# Patient Record
Sex: MALE | Race: WHITE | NOT HISPANIC OR LATINO
[De-identification: names, ages, dates, MRNs, and addresses within clinical notes are randomized per-mention and may not be internally consistent; named-entity substitution may affect disease eponyms.]

---

## 2018-11-20 ENCOUNTER — TRANSCRIPTION ENCOUNTER (OUTPATIENT)
Age: 63
End: 2018-11-20

## 2019-05-15 ENCOUNTER — TRANSCRIPTION ENCOUNTER (OUTPATIENT)
Age: 64
End: 2019-05-15

## 2021-07-26 ENCOUNTER — NON-APPOINTMENT (OUTPATIENT)
Age: 66
End: 2021-07-26

## 2021-08-06 PROBLEM — Z00.00 ENCOUNTER FOR PREVENTIVE HEALTH EXAMINATION: Status: ACTIVE | Noted: 2021-08-06

## 2021-08-10 ENCOUNTER — NON-APPOINTMENT (OUTPATIENT)
Age: 66
End: 2021-08-10

## 2021-08-11 ENCOUNTER — APPOINTMENT (OUTPATIENT)
Dept: GASTROENTEROLOGY | Facility: CLINIC | Age: 66
End: 2021-08-11
Payer: MEDICARE

## 2021-08-11 VITALS
OXYGEN SATURATION: 97 % | SYSTOLIC BLOOD PRESSURE: 110 MMHG | BODY MASS INDEX: 31.1 KG/M2 | HEART RATE: 62 BPM | TEMPERATURE: 96.5 F | DIASTOLIC BLOOD PRESSURE: 80 MMHG | WEIGHT: 210 LBS | HEIGHT: 69 IN

## 2021-08-11 DIAGNOSIS — Z78.9 OTHER SPECIFIED HEALTH STATUS: ICD-10-CM

## 2021-08-11 DIAGNOSIS — Z12.11 ENCOUNTER FOR SCREENING FOR MALIGNANT NEOPLASM OF COLON: ICD-10-CM

## 2021-08-11 DIAGNOSIS — R10.31 RIGHT LOWER QUADRANT PAIN: ICD-10-CM

## 2021-08-11 PROCEDURE — 99204 OFFICE O/P NEW MOD 45 MIN: CPT

## 2021-08-11 NOTE — PHYSICAL EXAM
[General Appearance - Alert] : alert [General Appearance - In No Acute Distress] : in no acute distress [Sclera] : the sclera and conjunctiva were normal [Outer Ear] : the ears and nose were normal in appearance [Neck Appearance] : the appearance of the neck was normal [] : no respiratory distress [Abdomen Soft] : soft [Abnormal Walk] : normal gait [No Focal Deficits] : no focal deficits [Skin Color & Pigmentation] : normal skin color and pigmentation [Oriented To Time, Place, And Person] : oriented to person, place, and time

## 2021-08-11 NOTE — ASSESSMENT
[FreeTextEntry1] : 1.  RLQ  pain:  improved but  persistent. CT scan ordered\par \par 2. Colon cancer screening:  Colonoscopy scheduled\par \par Risks of the procedure including but not limited to bleeding / perforation / infection / anesthesia complication / missed polyp or lesion explained to the  patient . The patient expressed understanding and a desire to proceed with the procedure.\par \par Risk of not doing procedure includes but is not limited to missed or delayed diagnosis of gastrointestinal pathology.\par

## 2021-08-11 NOTE — HISTORY OF PRESENT ILLNESS
[de-identified] : Present s  with a  5 month c/o RLQ pain. Pain was intense 5  months  ago. pain has gradually subsided and  now  occurs occasionally.  Denies  nausea, vomiting, fever, chills, diarrhea, constipation, melena, hematemesis, BRBPR, unexpected weight loss. No clear  precipitating / alleviating factors  identified.\par EGD / colonoscopy in 2/2012 for GERD / colon cancer screening  was notable  for HH  / esophagitis , diverticulosis ,hemorrhoids

## 2021-08-17 ENCOUNTER — RESULT REVIEW (OUTPATIENT)
Age: 66
End: 2021-08-17

## 2021-09-18 ENCOUNTER — RESULT REVIEW (OUTPATIENT)
Age: 66
End: 2021-09-18

## 2021-09-20 ENCOUNTER — RESULT REVIEW (OUTPATIENT)
Age: 66
End: 2021-09-20

## 2021-09-21 ENCOUNTER — APPOINTMENT (OUTPATIENT)
Dept: GASTROENTEROLOGY | Facility: HOSPITAL | Age: 66
End: 2021-09-21

## 2022-01-20 ENCOUNTER — NON-APPOINTMENT (OUTPATIENT)
Age: 67
End: 2022-01-20

## 2022-02-03 ENCOUNTER — LABORATORY RESULT (OUTPATIENT)
Age: 67
End: 2022-02-03

## 2022-02-03 ENCOUNTER — NON-APPOINTMENT (OUTPATIENT)
Age: 67
End: 2022-02-03

## 2022-02-03 ENCOUNTER — APPOINTMENT (OUTPATIENT)
Dept: HEART AND VASCULAR | Facility: CLINIC | Age: 67
End: 2022-02-03
Payer: MEDICARE

## 2022-02-03 VITALS
HEIGHT: 69.25 IN | WEIGHT: 214 LBS | DIASTOLIC BLOOD PRESSURE: 70 MMHG | SYSTOLIC BLOOD PRESSURE: 116 MMHG | BODY MASS INDEX: 31.34 KG/M2

## 2022-02-03 VITALS — OXYGEN SATURATION: 97 % | RESPIRATION RATE: 16 BRPM | HEART RATE: 66 BPM | TEMPERATURE: 97.8 F

## 2022-02-03 DIAGNOSIS — R53.83 OTHER FATIGUE: ICD-10-CM

## 2022-02-03 DIAGNOSIS — Z72.3 LACK OF PHYSICAL EXERCISE: ICD-10-CM

## 2022-02-03 DIAGNOSIS — Z78.9 OTHER SPECIFIED HEALTH STATUS: ICD-10-CM

## 2022-02-03 DIAGNOSIS — Z86.010 PERSONAL HISTORY OF COLONIC POLYPS: ICD-10-CM

## 2022-02-03 DIAGNOSIS — Z87.19 PERSONAL HISTORY OF OTHER DISEASES OF THE DIGESTIVE SYSTEM: ICD-10-CM

## 2022-02-03 DIAGNOSIS — R03.0 ELEVATED BLOOD-PRESSURE READING, W/OUT DIAGNOSIS OF HYPERTENSION: ICD-10-CM

## 2022-02-03 DIAGNOSIS — Z82.49 FAMILY HISTORY OF ISCHEMIC HEART DISEASE AND OTHER DISEASES OF THE CIRCULATORY SYSTEM: ICD-10-CM

## 2022-02-03 DIAGNOSIS — Z83.3 FAMILY HISTORY OF DIABETES MELLITUS: ICD-10-CM

## 2022-02-03 DIAGNOSIS — R00.2 PALPITATIONS: ICD-10-CM

## 2022-02-03 DIAGNOSIS — R07.89 OTHER CHEST PAIN: ICD-10-CM

## 2022-02-03 DIAGNOSIS — R06.00 DYSPNEA, UNSPECIFIED: ICD-10-CM

## 2022-02-03 DIAGNOSIS — E78.5 HYPERLIPIDEMIA, UNSPECIFIED: ICD-10-CM

## 2022-02-03 DIAGNOSIS — R55 SYNCOPE AND COLLAPSE: ICD-10-CM

## 2022-02-03 PROCEDURE — 93000 ELECTROCARDIOGRAM COMPLETE: CPT | Mod: 59

## 2022-02-03 PROCEDURE — 99205 OFFICE O/P NEW HI 60 MIN: CPT | Mod: 25

## 2022-02-03 PROCEDURE — 93248 EXT ECG>7D<15D REV&INTERPJ: CPT

## 2022-02-03 PROCEDURE — 93246 EXT ECG>7D<15D RECORDING: CPT

## 2022-02-03 NOTE — DISCUSSION/SUMMARY
[Paroxysmal Atrial Fibrillation] : paroxysmal atrial fibrillation [Possible Cardiac Ischemia (Intermd Prob)] : possible cardiac ischemia (intermediate probability) [Hyperlipidemia] : hyperlipidemia [Diet Modification] : diet modification [Exercise] : exercise [Hypertension] : hypertension [Exercise Regimen] : an exercise regimen [Dietary Modification] : dietary modification [Weight Loss] : weight loss [ETOH Moderation] : alcohol moderation [Rhythm Disorder] : rhythm disorder [Deteriorating] : deteriorating [Low Sodium Diet] : low sodium diet [Vasovagal Syncope] : vasovagal syncope [Stable] : stable [None] : There are no changes in medication management [Patient] : the patient [de-identified] : HOOD/Fatigue

## 2022-02-03 NOTE — REASON FOR VISIT
[Symptom and Test Evaluation] : symptom and test evaluation [Arrhythmia/ECG Abnorrmalities] : arrhythmia/ECG abnormalities [Hyperlipidemia] : hyperlipidemia [FreeTextEntry3] : Monroe Lucas

## 2022-02-03 NOTE — HISTORY OF PRESENT ILLNESS
[FreeTextEntry1] : Virgil Patel is a 65 yo male who presents for CV evaluation.  \par \par Approximately 4 years ago, he was evaluated at [facility] for AFIB.  He was not treated with rate control or TE prophylaxis.\par \par He has been experiencing palps (similar to his AFIB symptoms) intermittently, lasting for several hours at a time.  He has noted inc HOOD and fatigue.  He has exertional chest tightness as well.  He snores at night, wakes up feeling tired, and has daytime fatigue.  He denies pnd, orthopnea, or edema.  He did experience a "pre-syncopal" like episode at home a couple of days ago.  He recalls a "vasovagal" event years ago.  \par \par He is active.  He takes no medications.\par \par ECG today reveals NSR.\par \par Clinical hx reviewed in detail.\par \par Recommendations:\par 1. blood work\par 2. telemetry\par 3. t/c sleep eval \par 4. carotid duplex\par 5. CPET\par 6. EXSE\par 7. t/c CTA\par 8. t/c EP eval

## 2022-02-04 LAB
ALBUMIN SERPL ELPH-MCNC: 4.7 G/DL
ALP BLD-CCNC: 90 U/L
ALT SERPL-CCNC: 24 U/L
ANION GAP SERPL CALC-SCNC: 15 MMOL/L
AST SERPL-CCNC: 18 U/L
BASOPHILS # BLD AUTO: 0.05 K/UL
BASOPHILS NFR BLD AUTO: 0.8 %
BILIRUB SERPL-MCNC: 0.5 MG/DL
BUN SERPL-MCNC: 13 MG/DL
CALCIUM SERPL-MCNC: 9.6 MG/DL
CHLORIDE SERPL-SCNC: 103 MMOL/L
CHOLEST SERPL-MCNC: 245 MG/DL
CK SERPL-CCNC: 71 U/L
CO2 SERPL-SCNC: 24 MMOL/L
CREAT SERPL-MCNC: 1.08 MG/DL
EOSINOPHIL # BLD AUTO: 0.08 K/UL
EOSINOPHIL NFR BLD AUTO: 1.2 %
ESTIMATED AVERAGE GLUCOSE: 108 MG/DL
FERRITIN SERPL-MCNC: 67 NG/ML
GLUCOSE SERPL-MCNC: 97 MG/DL
HBA1C MFR BLD HPLC: 5.4 %
HCT VFR BLD CALC: 43.7 %
HDLC SERPL-MCNC: 72 MG/DL
HGB BLD-MCNC: 14.1 G/DL
IMM GRANULOCYTES NFR BLD AUTO: 0.2 %
IRON SATN MFR SERPL: 39 %
IRON SERPL-MCNC: 143 UG/DL
LDLC SERPL CALC-MCNC: 146 MG/DL
LYMPHOCYTES # BLD AUTO: 2.01 K/UL
LYMPHOCYTES NFR BLD AUTO: 31.1 %
MAN DIFF?: NORMAL
MCHC RBC-ENTMCNC: 30.9 PG
MCHC RBC-ENTMCNC: 32.3 GM/DL
MCV RBC AUTO: 95.6 FL
MONOCYTES # BLD AUTO: 0.47 K/UL
MONOCYTES NFR BLD AUTO: 7.3 %
NEUTROPHILS # BLD AUTO: 3.85 K/UL
NEUTROPHILS NFR BLD AUTO: 59.4 %
NONHDLC SERPL-MCNC: 173 MG/DL
NT-PROBNP SERPL-MCNC: 126 PG/ML
PLATELET # BLD AUTO: 260 K/UL
POTASSIUM SERPL-SCNC: 4.6 MMOL/L
PROT SERPL-MCNC: 7.1 G/DL
RBC # BLD: 4.57 M/UL
RBC # FLD: 13.5 %
SODIUM SERPL-SCNC: 142 MMOL/L
T3FREE SERPL-MCNC: 3.52 PG/ML
T3RU NFR SERPL: 1.1 TBI
T4 SERPL-MCNC: 8.7 UG/DL
TIBC SERPL-MCNC: 365 UG/DL
TRIGL SERPL-MCNC: 135 MG/DL
TROPONIN I SERPL-MCNC: 0.01 NG/ML
TROPONIN-T, HIGH SENSITIVITY: 10 NG/L
TSH SERPL-ACNC: 1.84 UIU/ML
UIBC SERPL-MCNC: 222 UG/DL
WBC # FLD AUTO: 6.47 K/UL

## 2022-03-04 ENCOUNTER — APPOINTMENT (OUTPATIENT)
Dept: HEART AND VASCULAR | Facility: CLINIC | Age: 67
End: 2022-03-04
Payer: MEDICARE

## 2022-03-04 ENCOUNTER — NON-APPOINTMENT (OUTPATIENT)
Age: 67
End: 2022-03-04

## 2022-03-04 VITALS
BODY MASS INDEX: 31.34 KG/M2 | DIASTOLIC BLOOD PRESSURE: 84 MMHG | HEIGHT: 69.25 IN | HEART RATE: 74 BPM | OXYGEN SATURATION: 97 % | WEIGHT: 214 LBS | SYSTOLIC BLOOD PRESSURE: 150 MMHG

## 2022-03-04 PROCEDURE — 93320 DOPPLER ECHO COMPLETE: CPT

## 2022-03-04 PROCEDURE — 93880 EXTRACRANIAL BILAT STUDY: CPT

## 2022-03-04 PROCEDURE — 93351 STRESS TTE COMPLETE: CPT

## 2022-03-04 PROCEDURE — 93325 DOPPLER ECHO COLOR FLOW MAPG: CPT

## 2022-03-08 ENCOUNTER — NON-APPOINTMENT (OUTPATIENT)
Age: 67
End: 2022-03-08

## 2022-03-10 ENCOUNTER — NON-APPOINTMENT (OUTPATIENT)
Age: 67
End: 2022-03-10

## 2022-03-11 ENCOUNTER — NON-APPOINTMENT (OUTPATIENT)
Age: 67
End: 2022-03-11

## 2022-03-11 ENCOUNTER — APPOINTMENT (OUTPATIENT)
Dept: HEART AND VASCULAR | Facility: CLINIC | Age: 67
End: 2022-03-11
Payer: MEDICARE

## 2022-03-11 VITALS
DIASTOLIC BLOOD PRESSURE: 60 MMHG | HEIGHT: 69 IN | BODY MASS INDEX: 32.58 KG/M2 | RESPIRATION RATE: 18 BRPM | TEMPERATURE: 97.2 F | WEIGHT: 220 LBS | SYSTOLIC BLOOD PRESSURE: 122 MMHG | OXYGEN SATURATION: 98 % | HEART RATE: 66 BPM

## 2022-03-11 DIAGNOSIS — I48.91 UNSPECIFIED ATRIAL FIBRILLATION: ICD-10-CM

## 2022-03-11 DIAGNOSIS — R06.83 SNORING: ICD-10-CM

## 2022-03-11 PROCEDURE — 93000 ELECTROCARDIOGRAM COMPLETE: CPT

## 2022-03-11 PROCEDURE — 99203 OFFICE O/P NEW LOW 30 MIN: CPT | Mod: 25

## 2022-03-11 RX ORDER — ASPIRIN ENTERIC COATED TABLETS 81 MG 81 MG/1
81 TABLET, DELAYED RELEASE ORAL
Refills: 0 | Status: ACTIVE | COMMUNITY
Start: 2022-03-11

## 2022-03-11 NOTE — REASON FOR VISIT
[FreeTextEntry1] : 66 y.o. M HLD who was diagnosed with AF 4-5 years ago at [facility] who presents for an initial visit. \par \par The patient notes that his AF episode 4-5 years ago lasted a few days. He was kept in the hospital and he eventually self-converted. He was reportedly not placed on any medicines because his baseline HR is low/normal. He has not been on a blood thinner. He does report sob when he bends over but denies lightheadedness. \par \par LT (2/2022): SR 47-, brief SVT, longest 20 beats

## 2022-04-05 ENCOUNTER — APPOINTMENT (OUTPATIENT)
Dept: HEART AND VASCULAR | Facility: CLINIC | Age: 67
End: 2022-04-05
Payer: MEDICARE

## 2022-04-05 VITALS
DIASTOLIC BLOOD PRESSURE: 80 MMHG | BODY MASS INDEX: 31.21 KG/M2 | TEMPERATURE: 98 F | WEIGHT: 218 LBS | OXYGEN SATURATION: 98 % | HEIGHT: 70 IN | SYSTOLIC BLOOD PRESSURE: 128 MMHG

## 2022-04-05 PROCEDURE — 94621 CARDIOPULM EXERCISE TESTING: CPT

## 2022-04-05 PROCEDURE — 94010 BREATHING CAPACITY TEST: CPT | Mod: 59

## 2022-04-15 DIAGNOSIS — R94.39 ABNORMAL RESULT OF OTHER CARDIOVASCULAR FUNCTION STUDY: ICD-10-CM

## 2022-04-15 DIAGNOSIS — I51.7 CARDIOMEGALY: ICD-10-CM

## 2022-04-15 DIAGNOSIS — I35.1 NONRHEUMATIC AORTIC (VALVE) INSUFFICIENCY: ICD-10-CM

## 2022-04-15 DIAGNOSIS — I37.1 NONRHEUMATIC PULMONARY VALVE INSUFFICIENCY: ICD-10-CM

## 2022-04-15 DIAGNOSIS — I65.29 OCCLUSION AND STENOSIS OF UNSPECIFIED CAROTID ARTERY: ICD-10-CM

## 2022-04-15 DIAGNOSIS — I25.5 ISCHEMIC CARDIOMYOPATHY: ICD-10-CM

## 2022-04-21 ENCOUNTER — APPOINTMENT (OUTPATIENT)
Dept: HEART AND VASCULAR | Facility: CLINIC | Age: 67
End: 2022-04-21
Payer: MEDICARE

## 2022-04-21 VITALS — HEART RATE: 60 BPM | SYSTOLIC BLOOD PRESSURE: 132 MMHG | DIASTOLIC BLOOD PRESSURE: 78 MMHG

## 2022-04-21 PROCEDURE — 36415 COLL VENOUS BLD VENIPUNCTURE: CPT

## 2022-04-22 LAB
ANION GAP SERPL CALC-SCNC: 14 MMOL/L
BUN SERPL-MCNC: 11 MG/DL
CALCIUM SERPL-MCNC: 10.2 MG/DL
CHLORIDE SERPL-SCNC: 105 MMOL/L
CO2 SERPL-SCNC: 24 MMOL/L
CREAT SERPL-MCNC: 1.18 MG/DL
EGFR: 68 ML/MIN/1.73M2
GLUCOSE SERPL-MCNC: 100 MG/DL
POTASSIUM SERPL-SCNC: 5.4 MMOL/L
SODIUM SERPL-SCNC: 143 MMOL/L

## 2022-05-11 ENCOUNTER — RESULT REVIEW (OUTPATIENT)
Age: 67
End: 2022-05-11

## 2022-05-13 ENCOUNTER — NON-APPOINTMENT (OUTPATIENT)
Age: 67
End: 2022-05-13

## 2022-05-13 RX ORDER — METOPROLOL SUCCINATE 50 MG/1
50 TABLET, EXTENDED RELEASE ORAL
Qty: 2 | Refills: 0 | Status: DISCONTINUED | COMMUNITY
Start: 2022-04-21 | End: 2022-05-13

## 2022-05-25 ENCOUNTER — APPOINTMENT (OUTPATIENT)
Dept: PULMONOLOGY | Facility: CLINIC | Age: 67
End: 2022-05-25

## 2022-09-14 ENCOUNTER — NON-APPOINTMENT (OUTPATIENT)
Age: 67
End: 2022-09-14

## 2022-11-17 ENCOUNTER — NON-APPOINTMENT (OUTPATIENT)
Age: 67
End: 2022-11-17

## 2022-11-17 ENCOUNTER — APPOINTMENT (OUTPATIENT)
Dept: PAIN MANAGEMENT | Facility: CLINIC | Age: 67
End: 2022-11-17

## 2022-11-17 VITALS
WEIGHT: 215 LBS | HEART RATE: 64 BPM | SYSTOLIC BLOOD PRESSURE: 138 MMHG | HEIGHT: 69 IN | DIASTOLIC BLOOD PRESSURE: 91 MMHG | BODY MASS INDEX: 31.84 KG/M2

## 2022-11-17 PROCEDURE — 99204 OFFICE O/P NEW MOD 45 MIN: CPT

## 2022-11-17 NOTE — HISTORY OF PRESENT ILLNESS
[Neck Pain] : neck pain [Constant] : constant [7] : a maximum pain level of 7/10 [Laying] : laying [FreeTextEntry1] : HPI\par \par Mr. JORDANA MAJANO is a 67 year M with pmhx of afib (self converted). Complaints of 7/10 worsening left sided neck and shoulder pain. Does not radiate down the arm. Has had it for a long time but has been worsening. Impacting ability to sleep. Has done cervical KARYN in the past with minimal relief. Denies any worsening numbness, weakness, bowel/bladder dysfunction\par \par \par \par Previous and current pain medications/doses/effects: \par \par NSAIDS without improvement\par \par Previous Pain Treatments: \par \par Exercises without improvement\par \par Previous Pain Injections: \par \par Dr Chino- cervical KARYN (last 2017)\par \par \par \par Previous Diagnostic Studies/Images: None\par \par MRI CS 11/15/15\par \par Patient of Jennifer Levin M.D\par \par CLINICAL HISTORY: Neck pain\par \par FINDINGS: There is 2 mm anterolisthesis of C4 upon CS. There is preservation of vertebral body height. There is loss of T2 signal throughout the intervertebral disc spaces with loss of disc space height at C5-C6 through C7-TI, findings related to the sequela of degenerative disc disease. There is focal marrow edema involving the left CS facet seen best in image #1 of series\par \par 4. A similar finding involves the right lateral mass of C4.\par \par At C2-C3, there is no disc bulge or herniation. There are left-sided facet joint degenerative changes with mid narrowing of the left neural foramen. The right neural foramen and centras canal are not compromised.\par \par At C3-C4, there is a left-sided disc osteophyte complex which partially effaces the left ventral CSF space but does not reach the cord. There are uncovertebral joint and facet joint hypertrophy with moderate-to-severe narrowing of the left neural foramen. The right neural foramen and central canal are not compromised.\par \par At C4-CS, there is a small disc osteophyte complex which partially effaces the ventral CSF space. There are uncovertebral joint and facet joint degenerative changes with moderate narrowing of the neural foramina. The central canal is not compromised.\par \par At CS-C6, there is a central and bilateral disc osteophyte complex which effaces the CSF space. This extends to, but does not cause mass effect on the cord. There are uncovertebral joint and facet joint degenerative changes with severe narrowing of the neural foramina.\par \par The central canal is not\par \par compromised.\par \par At C6-C7, there is a small disc osteophyte complex without mass effect. There are uncovertebral joint degenerative changes with moderate-to-severe narrowing of the neural foramina. The central canal is not compromised.\par \par At C7-TI, there is a small disc osteophyte complex which partially effaces the ventral CSF space but does not reach the cord. There are uncovertebral joint and facet joint degenerative changes with moderate narrowing of the neural foramina. The central canal is not compromised\par \par The cervical cord is normal size and signal. The craniovertebral junction is normal without Chiari malformation\par \par IMPRESSION: There are degenerative changes as described with multiple areas of neural foraminal narrowing most focally at C5-C6. There is no central stenosis.\par \par There is marrow edema involving the left lateral mass of C5 and the right lateral mass of C4. These findings may be related to osteoarthritis and can present as midcervical pain.\par \par \par

## 2022-11-17 NOTE — ASSESSMENT
[FreeTextEntry1] : >> Imaging and Other Studies\par \par I personally reviewed the relevant imaging.  Discussed and explained to patient the likely source of pathology and pain.  Questions answered. MRI\par \par >> Therapy and Other Modalities\par \par Home exercises\par \par >> Medications\par \par trial gabapentin uptitrate to TID\par cautioned change in mood.  Encouraged to call with any worsening mood or depression/suicidal ideations\par  \par acetaminophen 650mg q8h prn pain (caution <3g daily)\par \par >> Interventions\par \par may consider intervention\par \par >> Consults\par \par na\par \par >> Discussion of Risks/Benefits/Alternatives\par \par 	>Regarding any scheduled procedures:\par \par I have discussed in detail with the patient that any interventional pain procedure is associated with potential risks.  The procedure may include an injection of steroids and potentially other medications (local anesthetic and normal saline) into the epidural space or surrounding tissue of the spine.  There are significant risks of this procedure which include and are not limited to infection, bleeding, worsening pain, dural puncture leading to postdural puncture headache, nerve damage, spinal cord injury, paralysis, stroke, and death.  \par \par There is a chance that the procedure does not improve their pain.  \par \par There are risks associated with the steroid being absorbed into the body systemically.  These include dysphoria, difficulty sleeping, mood swings and personality changes.  Premenopausal women may notice an irregularity in her menstrual cycle for 2-3 months following the injection.  Steroids can specifically affect patients with hypertension, diabetes, and peptic ulcers.  The procedure may cause a temporary increase in blood pressure and blood pressure, and may adversely affect a peptic ulcer.  Other, more rare complications, include avascular necrosis of joints, glaucoma and worsening of osteoporosis. \par \par I have discussed the risks of the procedure at length with the patient, and the potential benefits of pain relief.  I have offered alternatives to the procedure.  All questions were answered.  \par \par The patient expressed understanding and wishes to proceed with the procedure.\par \par 	>Regarding COVID19 Pandemic: \par \par Any planned interventional pain procedure are scheduled because further delay may cause harm or negative outcome to patient.  The goal in performing this procedure is to avoid deterioration of function, emergency room visits (which increases exposure) and reliance on opioids.  \par \par r/b/a discussed with patient, lack of evidence to conclusively determine whether pain management procedures have any positive or negative impact on the possibility of rufino the virus and/or development of any sequelae. \par \par Patient counselled regarding timing steroid based intervention 2 weeks before or after COVID-19 vaccine administration to avoid any interaction or affect on efficacy of vaccination\par \par Patient demonstrates understanding\par \par Informed patient that risks associated with the COVID-19 infection.  Informed patient steps taken to limit the risks.  We are implementing safety precautions and following protocols consistent with the CDC and state recommendations. All patients and staff will be checked for fever or signs of illness upon entry to the facility. We will limit our steroid dose to the lowest effective therapeutic dose or in some cases steroids will not be injected at all. \par \par Patient agrees to proceed\par \par >> Conclusion\par \par The above diagnosis and treatment plan is medically reasonable and necessary based on the patient encounter \par There were no barriers to communication.\par Informed patient that I would be available for any additional questions.\par Patient was instructed to call with any worsening symptoms including severe pain, new numbness/weakness, or changes in the bowel/bladder function. \par Discussed role of nsaids in pain management and all relevant risks, if patient is continuing to require after 4 weeks the patient should f/u for alternative treatment. \par Instructed patient to maintain pain diary to monitor pain level, mobility, and function.\par \par \par

## 2022-11-17 NOTE — PHYSICAL EXAM
[Normal muscle bulk without asymmetry] : normal muscle bulk without asymmetry [Spine: Flexion to ___ degrees, without pain] : spine: flexion to [unfilled] degrees, without pain [Spurling] : positive Spurling's Test [de-identified] : Constitutional: Normal, well developed, no acute distress\par Eyes: Symmetric, External structures \par Oropharynx: Lips normal, symmetric, no external lesions appreciated\par Respiratory: Non-labored breathing, no audible wheezes\par Cardiac: Pulse palpated, no tachycardia\par Vascular: No cyanosis appreciated, no edema in bilateral lower extremities\par GI: Nondistended, no jaundice appreciated\par Neurovascular: CN2-12 grossly intact, Alert and oriented\par MSK: Normal muscle bulk, 5/5 Motor strength B/L in LE\par \par

## 2022-11-21 ENCOUNTER — RESULT REVIEW (OUTPATIENT)
Age: 67
End: 2022-11-21

## 2022-11-30 ENCOUNTER — APPOINTMENT (OUTPATIENT)
Dept: PAIN MANAGEMENT | Facility: CLINIC | Age: 67
End: 2022-11-30

## 2022-11-30 VITALS
HEIGHT: 69 IN | DIASTOLIC BLOOD PRESSURE: 87 MMHG | SYSTOLIC BLOOD PRESSURE: 146 MMHG | WEIGHT: 215 LBS | TEMPERATURE: 98 F | BODY MASS INDEX: 31.84 KG/M2

## 2022-11-30 PROCEDURE — 99214 OFFICE O/P EST MOD 30 MIN: CPT

## 2022-11-30 NOTE — HISTORY OF PRESENT ILLNESS
[3] : 3. What number best describes how, during the past week, pain has interfered with your general activity? 3/10 pain [Neck Pain] : neck pain [Constant] : constant [7] : a maximum pain level of 7/10 [Laying] : laying [FreeTextEntry1] : Interval Note:\par \par Since last visit the pain is not improved.  Continues to have left sided neck pain radiating to shoulder.  Reports gabapentin has helped mildly. Reports that he is able to tolerate it without sedation. Denies any additional weakness, numbness, bowel/bladder dysfunction.  \par \par \par HPI\par \par Mr. JORDANA MAJANO is a 67 year M with pmhx of afib (self converted). Complaints of 7/10 worsening left sided neck and shoulder pain. Does not radiate down the arm. Has had it for a long time but has been worsening. Impacting ability to sleep. Has done cervical KARYN in the past with minimal relief. Denies any worsening numbness, weakness, bowel/bladder dysfunction\par \par \par \par Previous and current pain medications/doses/effects: \par \par NSAIDS without improvement\par \par Previous Pain Treatments: \par \par Exercises without improvement\par \par Previous Pain Injections: \par \par Dr Chino- cervical KARYN (last 2017)\par \par \par \par Previous Diagnostic Studies/Images: None\par \par \par MRI CS 11/22\par \par There is mild anterolisthesis of C4 on C5 (approximately 3 mm). There is mild\par retrolisthesis of C5 on C6 and C6 on C7 (approximately 2 to 3 mm). There is reversal of the cervical\par spine. The vertebral bodies are of normal height. There is severe loss of disc height and T2 signal\par at the C5/C6, C6/C7 and C7/T1 levels. There are mild Modic surrounding and plate changes consistent\par with desiccation and degenerative disease. The remaining intervertebral discs spaces demonstrate\par desiccation. Evaluation of the spinal cord demonstrates no evidence of abnormal signal changes.  The\par marrow signal is within normal limits.  The cervicomedullary junction is normal.\par \par  Evaluation of the individual levels demonstrates at the C2/C3 level there is no evidence of a focal\par disc herniation. There is left uncovertebral and marked facet hypertrophy. There is moderate left\par foraminal narrowing. The right neuroforamen is patent. There is no evidence of spinal cord\par compression.\par \par  At the C3/C4 level there is a mild diffuse disc bulge flattening the ventral thecal sac, left\par greater than right. There is bilateral uncovertebral and facet hypertrophy. There is moderate to\par severe left foraminal narrowing. There is mild right neuroforaminal narrowing. There is no evidence\par of spinal cord compression.\par \par  At the C4/C5 level there is unroofing of the posterior aspect of the disc space due to the\par anterolisthesis. There is a superimposed diffuse disc bulge and osteophyte contacting the ventral\par spinal cord. There is bilateral uncovertebral and facet hypertrophy. There is moderate to severe\par bilateral foraminal narrowing. There is no evidence of spinal cord compression.\par \par  At the C5/C6 level there minimal to mild retrolisthesis with a disc osteophyte complex and\par superimposed left subarticular and foraminal disc protrusion (image #4 series 2) flattening the\par ventral spinal cord and contributing to severe left foraminal narrowing. There is bilateral\par uncovertebral and facet hypertrophy. There is severe bilateral foraminal narrowing, left greater\par than right. The constellation of findings is causing minimal spinal cord compression.\par \par  At the C6/C7 level there minimal to mild retrolisthesis with a disc osteophyte complex and\par superimposed left subarticular and foraminal disc protrusion (image #4 series 2) flattening the\par ventral thecal sac and contributing to severe left foraminal narrowing. There is bilateral\par uncovertebral and facet hypertrophy. There is severe bilateral foraminal narrowing, left greater\par than right. There is no evidence of spinal cord compression.\par \par  At the C7/T1 level there is a mild disc osteophyte complex. There is moderate bilateral foraminal\par narrowing. There is no evidence of spinal cord compression.\par \par \par  Impression:\par \par  Mild anterolisthesis of C4 on C5.\par \par  Mild retrolisthesis of C5 on C6 and C6 on C7.\par \par  Reversal of the cervical spine.\par \par  Multilevel degenerative changes most notable at C5/C6 and C6/C7.\par \par  C2/C3  no evidence of a focal disc herniation.   There is moderate left foraminal narrowing. C2/C3\par no evidence of spinal cord compression.\par \par  C3/C4  mild diffuse disc bulge with moderate to severe left foraminal narrowing. C3/C4 no evidence\par of spinal cord compression.\par \par  C4/C5 diffuse disc bulge and osteophyte mildly contacting the ventral spinal cord with moderate to\par severe bilateral foraminal narrowing. C4/C5 no evidence of spinal cord compression.\par \par  C5/C6  disc osteophyte complex and superimposed left subarticular and foraminal disc protrusion\par contributing to severe left foraminal narrowing. C5/C6 minimal spinal cord compression.\par \par  C6/C7  disc osteophyte complex and superimposed left subarticular and foraminal disc protrusion\par contributing to severe left foraminal narrowing. C6/C7 no evidence of spinal cord compression.\par \par  C7/T1  mild disc osteophyte complex with moderate bilateral foraminal narrowing. C7/T1 no evidence\par of spinal cord compression.\par \par MRI CS 11/15/15\par \par Patient of Jennifer Levin M.D\par \par CLINICAL HISTORY: Neck pain\par \par FINDINGS: There is 2 mm anterolisthesis of C4 upon CS. There is preservation of vertebral body height. There is loss of T2 signal throughout the intervertebral disc spaces with loss of disc space height at C5-C6 through C7-TI, findings related to the sequela of degenerative disc disease. There is focal marrow edema involving the left CS facet seen best in image #1 of series\par \par 4. A similar finding involves the right lateral mass of C4.\par \par At C2-C3, there is no disc bulge or herniation. There are left-sided facet joint degenerative changes with mid narrowing of the left neural foramen. The right neural foramen and centras canal are not compromised.\par \par At C3-C4, there is a left-sided disc osteophyte complex which partially effaces the left ventral CSF space but does not reach the cord. There are uncovertebral joint and facet joint hypertrophy with moderate-to-severe narrowing of the left neural foramen. The right neural foramen and central canal are not compromised.\par \par At C4-CS, there is a small disc osteophyte complex which partially effaces the ventral CSF space. There are uncovertebral joint and facet joint degenerative changes with moderate narrowing of the neural foramina. The central canal is not compromised.\par \par At CS-C6, there is a central and bilateral disc osteophyte complex which effaces the CSF space. This extends to, but does not cause mass effect on the cord. There are uncovertebral joint and facet joint degenerative changes with severe narrowing of the neural foramina.\par \par The central canal is not\par \par compromised.\par \par At C6-C7, there is a small disc osteophyte complex without mass effect. There are uncovertebral joint degenerative changes with moderate-to-severe narrowing of the neural foramina. The central canal is not compromised.\par \par At C7-TI, there is a small disc osteophyte complex which partially effaces the ventral CSF space but does not reach the cord. There are uncovertebral joint and facet joint degenerative changes with moderate narrowing of the neural foramina. The central canal is not compromised\par \par The cervical cord is normal size and signal. The craniovertebral junction is normal without Chiari malformation\par \par IMPRESSION: There are degenerative changes as described with multiple areas of neural foraminal narrowing most focally at C5-C6. There is no central stenosis.\par \par There is marrow edema involving the left lateral mass of C5 and the right lateral mass of C4. These findings may be related to osteoarthritis and can present as midcervical pain.\par \par \par  [FreeTextEntry2] : 9

## 2022-11-30 NOTE — ASSESSMENT
[FreeTextEntry1] : >> Imaging and Other Studies\par \par I personally reviewed the relevant imaging.  Discussed and explained to patient the likely source of pathology and pain.  Questions answered. MRI\par \par >> Therapy and Other Modalities\par \par Home exercises\par \par >> Medications\par \par gabapentin 300mg TID\par cautioned change in mood.  Encouraged to call with any worsening mood or depression/suicidal ideations\par  \par acetaminophen 650mg q8h prn pain (caution <3g daily)\par \par >> Interventions\par \par \par MRI demonstrating disc herniation causing radiculopathy, significantly impactful to ability to perform ADL and refractory to conservative treatments, including physician directed exercises/PT ().  Will schedule C7-T1 interlaminar epidural steroid injection r/b/a discussed \par \par >> Consults\par \par na\par \par >> Discussion of Risks/Benefits/Alternatives\par \par 	>Regarding any scheduled procedures:\par \par I have discussed in detail with the patient that any interventional pain procedure is associated with potential risks.  The procedure may include an injection of steroids and potentially other medications (local anesthetic and normal saline) into the epidural space or surrounding tissue of the spine.  There are significant risks of this procedure which include and are not limited to infection, bleeding, worsening pain, dural puncture leading to postdural puncture headache, nerve damage, spinal cord injury, paralysis, stroke, and death.  \par \par There is a chance that the procedure does not improve their pain.  \par \par There are risks associated with the steroid being absorbed into the body systemically.  These include dysphoria, difficulty sleeping, mood swings and personality changes.  Premenopausal women may notice an irregularity in her menstrual cycle for 2-3 months following the injection.  Steroids can specifically affect patients with hypertension, diabetes, and peptic ulcers.  The procedure may cause a temporary increase in blood pressure and blood pressure, and may adversely affect a peptic ulcer.  Other, more rare complications, include avascular necrosis of joints, glaucoma and worsening of osteoporosis. \par \par I have discussed the risks of the procedure at length with the patient, and the potential benefits of pain relief.  I have offered alternatives to the procedure.  All questions were answered.  \par \par The patient expressed understanding and wishes to proceed with the procedure.\par \par 	>Regarding COVID19 Pandemic: \par \par Any planned interventional pain procedure are scheduled because further delay may cause harm or negative outcome to patient.  The goal in performing this procedure is to avoid deterioration of function, emergency room visits (which increases exposure) and reliance on opioids.  \par \par r/b/a discussed with patient, lack of evidence to conclusively determine whether pain management procedures have any positive or negative impact on the possibility of rufino the virus and/or development of any sequelae. \par \par Patient counselled regarding timing steroid based intervention 2 weeks before or after COVID-19 vaccine administration to avoid any interaction or affect on efficacy of vaccination\par \par Patient demonstrates understanding\par \par Informed patient that risks associated with the COVID-19 infection.  Informed patient steps taken to limit the risks.  We are implementing safety precautions and following protocols consistent with the CDC and state recommendations. All patients and staff will be checked for fever or signs of illness upon entry to the facility. We will limit our steroid dose to the lowest effective therapeutic dose or in some cases steroids will not be injected at all. \par \par Patient agrees to proceed\par \par >> Conclusion\par \par The above diagnosis and treatment plan is medically reasonable and necessary based on the patient encounter \par There were no barriers to communication.\par Informed patient that I would be available for any additional questions.\par Patient was instructed to call with any worsening symptoms including severe pain, new numbness/weakness, or changes in the bowel/bladder function. \par Discussed role of nsaids in pain management and all relevant risks, if patient is continuing to require after 4 weeks the patient should f/u for alternative treatment. \par Instructed patient to maintain pain diary to monitor pain level, mobility, and function.\par \par \par

## 2022-11-30 NOTE — PHYSICAL EXAM
[Normal] : Well developed, in no acute distress, alert and oriented to person, place and time [Normal muscle bulk without asymmetry] : normal muscle bulk without asymmetry [Facet Tenderness] : no facet tenderness [Spurling] : positive Spurling's Test [] : Motor: [NL] : normal and symmetric bilaterally

## 2022-12-09 ENCOUNTER — TRANSCRIPTION ENCOUNTER (OUTPATIENT)
Age: 67
End: 2022-12-09

## 2022-12-09 ENCOUNTER — APPOINTMENT (OUTPATIENT)
Dept: PAIN MANAGEMENT | Facility: HOSPITAL | Age: 67
End: 2022-12-09

## 2023-01-09 ENCOUNTER — NON-APPOINTMENT (OUTPATIENT)
Age: 68
End: 2023-01-09

## 2023-04-13 ENCOUNTER — RX RENEWAL (OUTPATIENT)
Age: 68
End: 2023-04-13

## 2023-09-13 ENCOUNTER — APPOINTMENT (OUTPATIENT)
Dept: PAIN MANAGEMENT | Facility: CLINIC | Age: 68
End: 2023-09-13
Payer: MEDICARE

## 2023-09-13 VITALS
BODY MASS INDEX: 31.84 KG/M2 | HEIGHT: 69 IN | DIASTOLIC BLOOD PRESSURE: 89 MMHG | WEIGHT: 215 LBS | SYSTOLIC BLOOD PRESSURE: 161 MMHG

## 2023-09-13 PROCEDURE — 99214 OFFICE O/P EST MOD 30 MIN: CPT

## 2023-09-20 ENCOUNTER — TRANSCRIPTION ENCOUNTER (OUTPATIENT)
Age: 68
End: 2023-09-20

## 2023-09-20 ENCOUNTER — APPOINTMENT (OUTPATIENT)
Dept: PAIN MANAGEMENT | Facility: HOSPITAL | Age: 68
End: 2023-09-20

## 2023-10-04 ENCOUNTER — APPOINTMENT (OUTPATIENT)
Dept: PAIN MANAGEMENT | Facility: CLINIC | Age: 68
End: 2023-10-04
Payer: MEDICARE

## 2023-10-04 VITALS
DIASTOLIC BLOOD PRESSURE: 72 MMHG | BODY MASS INDEX: 31.84 KG/M2 | HEIGHT: 69 IN | SYSTOLIC BLOOD PRESSURE: 124 MMHG | WEIGHT: 215 LBS

## 2023-10-04 PROCEDURE — 99214 OFFICE O/P EST MOD 30 MIN: CPT

## 2023-10-11 ENCOUNTER — APPOINTMENT (OUTPATIENT)
Dept: PAIN MANAGEMENT | Facility: CLINIC | Age: 68
End: 2023-10-11
Payer: MEDICARE

## 2023-10-11 VITALS
HEIGHT: 69 IN | DIASTOLIC BLOOD PRESSURE: 78 MMHG | WEIGHT: 215 LBS | BODY MASS INDEX: 31.84 KG/M2 | SYSTOLIC BLOOD PRESSURE: 134 MMHG

## 2023-10-11 PROCEDURE — 99214 OFFICE O/P EST MOD 30 MIN: CPT

## 2023-10-11 RX ORDER — TIZANIDINE 2 MG/1
2 TABLET ORAL
Qty: 45 | Refills: 1 | Status: ACTIVE | COMMUNITY
Start: 2023-10-11 | End: 1900-01-01

## 2023-10-12 ENCOUNTER — TRANSCRIPTION ENCOUNTER (OUTPATIENT)
Age: 68
End: 2023-10-12

## 2023-10-12 ENCOUNTER — APPOINTMENT (OUTPATIENT)
Dept: PAIN MANAGEMENT | Facility: HOSPITAL | Age: 68
End: 2023-10-12

## 2023-10-17 ENCOUNTER — APPOINTMENT (OUTPATIENT)
Dept: PAIN MANAGEMENT | Facility: CLINIC | Age: 68
End: 2023-10-17
Payer: MEDICARE

## 2023-10-17 DIAGNOSIS — M79.18 MYALGIA, OTHER SITE: ICD-10-CM

## 2023-10-17 DIAGNOSIS — M54.12 RADICULOPATHY, CERVICAL REGION: ICD-10-CM

## 2023-10-17 DIAGNOSIS — M48.02 SPINAL STENOSIS, CERVICAL REGION: ICD-10-CM

## 2023-10-17 DIAGNOSIS — G89.4 CHRONIC PAIN SYNDROME: ICD-10-CM

## 2023-10-17 PROCEDURE — 99214 OFFICE O/P EST MOD 30 MIN: CPT | Mod: 95

## 2023-10-18 RX ORDER — GABAPENTIN 300 MG/1
300 CAPSULE ORAL
Qty: 180 | Refills: 1 | Status: ACTIVE | COMMUNITY
Start: 2022-11-17 | End: 1900-01-01

## 2023-10-19 RX ORDER — METHYLPREDNISOLONE 4 MG/1
4 TABLET ORAL
Qty: 1 | Refills: 0 | Status: ACTIVE | COMMUNITY
Start: 2023-10-17 | End: 1900-01-01

## 2023-10-19 RX ORDER — GABAPENTIN 300 MG/1
300 CAPSULE ORAL
Qty: 270 | Refills: 1 | Status: ACTIVE | COMMUNITY
Start: 2023-10-17 | End: 1900-01-01

## 2023-11-10 ENCOUNTER — NON-APPOINTMENT (OUTPATIENT)
Age: 68
End: 2023-11-10

## 2024-05-09 ENCOUNTER — APPOINTMENT (OUTPATIENT)
Dept: HEART AND VASCULAR | Facility: CLINIC | Age: 69
End: 2024-05-09
Payer: MEDICARE

## 2024-05-09 ENCOUNTER — NON-APPOINTMENT (OUTPATIENT)
Age: 69
End: 2024-05-09

## 2024-05-09 VITALS
WEIGHT: 215 LBS | OXYGEN SATURATION: 97 % | BODY MASS INDEX: 31.84 KG/M2 | DIASTOLIC BLOOD PRESSURE: 86 MMHG | HEIGHT: 69 IN | HEART RATE: 63 BPM | SYSTOLIC BLOOD PRESSURE: 144 MMHG

## 2024-05-09 PROCEDURE — 36415 COLL VENOUS BLD VENIPUNCTURE: CPT

## 2024-05-09 PROCEDURE — 93246 EXT ECG>7D<15D RECORDING: CPT

## 2024-05-09 PROCEDURE — 99215 OFFICE O/P EST HI 40 MIN: CPT

## 2024-05-09 RX ORDER — ROSUVASTATIN CALCIUM 5 MG/1
5 TABLET, FILM COATED ORAL
Qty: 90 | Refills: 3 | Status: ACTIVE | COMMUNITY
Start: 2022-02-08 | End: 1900-01-01

## 2024-05-10 LAB
ALBUMIN SERPL ELPH-MCNC: 4.6 G/DL
ALP BLD-CCNC: 91 U/L
ALT SERPL-CCNC: 19 U/L
ANION GAP SERPL CALC-SCNC: 12 MMOL/L
AST SERPL-CCNC: 18 U/L
BASOPHILS # BLD AUTO: 0.05 K/UL
BASOPHILS NFR BLD AUTO: 0.8 %
BILIRUB SERPL-MCNC: 0.5 MG/DL
BUN SERPL-MCNC: 13 MG/DL
CALCIUM SERPL-MCNC: 9.3 MG/DL
CHLORIDE SERPL-SCNC: 103 MMOL/L
CHOLEST SERPL-MCNC: 150 MG/DL
CK SERPL-CCNC: 86 U/L
CO2 SERPL-SCNC: 28 MMOL/L
CREAT SERPL-MCNC: 1.15 MG/DL
CRP SERPL HS-MCNC: 3.9 MG/L
EGFR: 69 ML/MIN/1.73M2
EOSINOPHIL # BLD AUTO: 0.1 K/UL
EOSINOPHIL NFR BLD AUTO: 1.7 %
ESTIMATED AVERAGE GLUCOSE: 105 MG/DL
GLUCOSE SERPL-MCNC: 79 MG/DL
HBA1C MFR BLD HPLC: 5.3 %
HCT VFR BLD CALC: 44 %
HDLC SERPL-MCNC: 67 MG/DL
HGB BLD-MCNC: 14.3 G/DL
IMM GRANULOCYTES NFR BLD AUTO: 0.3 %
LDLC SERPL CALC-MCNC: 65 MG/DL
LYMPHOCYTES # BLD AUTO: 1.76 K/UL
LYMPHOCYTES NFR BLD AUTO: 29.2 %
MAN DIFF?: NORMAL
MCHC RBC-ENTMCNC: 31.3 PG
MCHC RBC-ENTMCNC: 32.5 GM/DL
MCV RBC AUTO: 96.3 FL
MONOCYTES # BLD AUTO: 0.45 K/UL
MONOCYTES NFR BLD AUTO: 7.5 %
NEUTROPHILS # BLD AUTO: 3.64 K/UL
NEUTROPHILS NFR BLD AUTO: 60.5 %
NONHDLC SERPL-MCNC: 83 MG/DL
NT-PROBNP SERPL-MCNC: 92 PG/ML
PLATELET # BLD AUTO: 242 K/UL
POTASSIUM SERPL-SCNC: 4.3 MMOL/L
PROT SERPL-MCNC: 6.9 G/DL
RBC # BLD: 4.57 M/UL
RBC # FLD: 13.8 %
SODIUM SERPL-SCNC: 142 MMOL/L
TRIGL SERPL-MCNC: 98 MG/DL
TSH SERPL-ACNC: 0.99 UIU/ML
WBC # FLD AUTO: 6.02 K/UL

## 2024-05-14 LAB — APO LP(A) SERPL-MCNC: <9 NMOL/L

## 2024-05-20 ENCOUNTER — NON-APPOINTMENT (OUTPATIENT)
Age: 69
End: 2024-05-20

## 2024-05-28 NOTE — HISTORY OF PRESENT ILLNESS
[FreeTextEntry1] : 69 yo male with h/o A.fib (followed by Dr. Castro, not on AC), HTN, HLD here to re-establish cardiac care. Previously followed by Dr. Talat Roberts, last seen 2022 Approximately 4 years ago, he was evaluated at [facility] for AFIB.  He was not treated with rate control or TE prophylaxis. Denies any significant chest discomfort, palpitations, LE edema, PND or syncope. Gets dyspneic with increased exertion. He is active.  He takes no medications.  Cardiac Workup: ECG today reveals NSR, low voltage with poor RWP Cath 2022: Non-obstructive CAD (20% mid LAD, 40-50% RCA) TTE: normal LV size/function. LVEF 60%, normal RV size/function, normal aorta size. No significant valvular disease Carotids: mild plaque b/l, normal velocities LTM (2/2022): SR 47-, brief SVT, longest 20 beats

## 2024-05-28 NOTE — REASON FOR VISIT
[Symptom and Test Evaluation] : symptom and test evaluation [Arrhythmia/ECG Abnorrmalities] : arrhythmia/ECG abnormalities [Hyperlipidemia] : hyperlipidemia [FreeTextEntry3] : Monroe Lucas [FreeTextEntry1] : Follow up cardiac care

## 2024-05-28 NOTE — DISCUSSION/SUMMARY
[Paroxysmal Atrial Fibrillation] : paroxysmal atrial fibrillation [Possible Cardiac Ischemia (Intermd Prob)] : possible cardiac ischemia (intermediate probability) [Hyperlipidemia] : hyperlipidemia [Diet Modification] : diet modification [Exercise] : exercise [Hypertension] : hypertension [Exercise Regimen] : an exercise regimen [Dietary Modification] : dietary modification [Weight Loss] : weight loss [ETOH Moderation] : alcohol moderation [Rhythm Disorder] : rhythm disorder [Deteriorating] : deteriorating [Low Sodium Diet] : low sodium diet [Vasovagal Syncope] : vasovagal syncope [Stable] : stable [None] : There are no changes in medication management [Patient] : the patient [de-identified] : HOOD/Fatigue

## 2024-05-28 NOTE — REVIEW OF SYSTEMS
[Feeling Fatigued] : feeling fatigued [Negative] : Heme/Lymph [Fever] : no fever [Headache] : no headache [Chills] : no chills [FreeTextEntry5] : as per HPI

## 2024-05-28 NOTE — ASSESSMENT
[FreeTextEntry1] : 69 yo male with h/o A.fib (followed by Dr. Castro, not on AC), HTN, HLD here to re-establish cardiac care.  #Dyspnea on exertion --will obtain TTE (almazan) --exercise stress echo (almazan)  #A.fib not on AC - one time episode 4 years ago, no recurrence --will benefit from repeat Holter --GUNJAN eval --follow up with Dr. Castro ()   # Hypertension -  borderline elevated - continue current anti-hypertensives - home BP monitoring encouraged - continued exercise as tolerated - Low salt diet  # Hyperlipidemia -  Sub-optimally Controlled, Aim for a LDL of <70 - blood work pending --continue current statin - Low fat low cholesterol diet - Heart healthy diet emphasized - Exercise as tolerated

## 2024-06-30 ENCOUNTER — NON-APPOINTMENT (OUTPATIENT)
Age: 69
End: 2024-06-30

## 2025-06-09 ENCOUNTER — NON-APPOINTMENT (OUTPATIENT)
Age: 70
End: 2025-06-09

## 2025-06-11 ENCOUNTER — NON-APPOINTMENT (OUTPATIENT)
Age: 70
End: 2025-06-11

## 2025-06-24 ENCOUNTER — NON-APPOINTMENT (OUTPATIENT)
Age: 70
End: 2025-06-24

## 2025-06-26 ENCOUNTER — APPOINTMENT (OUTPATIENT)
Dept: HEART AND VASCULAR | Facility: CLINIC | Age: 70
End: 2025-06-26
Payer: MEDICARE

## 2025-06-26 PROBLEM — I49.3 PVC'S (PREMATURE VENTRICULAR CONTRACTIONS): Status: ACTIVE | Noted: 2025-06-26

## 2025-06-26 PROCEDURE — 99214 OFFICE O/P EST MOD 30 MIN: CPT

## 2025-06-30 ENCOUNTER — APPOINTMENT (OUTPATIENT)
Dept: CARDIOLOGY | Facility: CLINIC | Age: 70
End: 2025-06-30
Payer: MEDICARE

## 2025-06-30 PROCEDURE — 36415 COLL VENOUS BLD VENIPUNCTURE: CPT

## 2025-07-01 ENCOUNTER — NON-APPOINTMENT (OUTPATIENT)
Age: 70
End: 2025-07-01

## 2025-07-01 LAB
ALBUMIN SERPL ELPH-MCNC: 4.2 G/DL
ALP BLD-CCNC: 105 U/L
ALT SERPL-CCNC: 28 U/L
ANION GAP SERPL CALC-SCNC: 15 MMOL/L
AST SERPL-CCNC: 24 U/L
BASOPHILS # BLD AUTO: 0.06 K/UL
BASOPHILS NFR BLD AUTO: 1 %
BILIRUB SERPL-MCNC: 0.5 MG/DL
BUN SERPL-MCNC: 10 MG/DL
CALCIUM SERPL-MCNC: 9.1 MG/DL
CHLORIDE SERPL-SCNC: 104 MMOL/L
CHOLEST SERPL-MCNC: 191 MG/DL
CK SERPL-CCNC: 64 U/L
CO2 SERPL-SCNC: 23 MMOL/L
CREAT SERPL-MCNC: 1.12 MG/DL
CRP SERPL HS-MCNC: 2.18 MG/L
EGFRCR SERPLBLD CKD-EPI 2021: 71 ML/MIN/1.73M2
EOSINOPHIL # BLD AUTO: 0.19 K/UL
EOSINOPHIL NFR BLD AUTO: 3 %
ESTIMATED AVERAGE GLUCOSE: 114 MG/DL
GLUCOSE SERPL-MCNC: 90 MG/DL
HBA1C MFR BLD HPLC: 5.6 %
HCT VFR BLD CALC: 43.4 %
HDLC SERPL-MCNC: 66 MG/DL
HGB BLD-MCNC: 13.3 G/DL
IMM GRANULOCYTES NFR BLD AUTO: 0.3 %
LDLC SERPL-MCNC: 103 MG/DL
LYMPHOCYTES # BLD AUTO: 1.89 K/UL
LYMPHOCYTES NFR BLD AUTO: 30.1 %
MAN DIFF?: NORMAL
MCHC RBC-ENTMCNC: 30.4 PG
MCHC RBC-ENTMCNC: 30.6 G/DL
MCV RBC AUTO: 99.1 FL
MONOCYTES # BLD AUTO: 0.59 K/UL
MONOCYTES NFR BLD AUTO: 9.4 %
NEUTROPHILS # BLD AUTO: 3.53 K/UL
NEUTROPHILS NFR BLD AUTO: 56.2 %
NONHDLC SERPL-MCNC: 124 MG/DL
PLATELET # BLD AUTO: 237 K/UL
POTASSIUM SERPL-SCNC: 4.3 MMOL/L
PROT SERPL-MCNC: 6.3 G/DL
RBC # BLD: 4.38 M/UL
RBC # FLD: 14.3 %
SODIUM SERPL-SCNC: 142 MMOL/L
TRIGL SERPL-MCNC: 122 MG/DL
TSH SERPL-ACNC: 1.82 UIU/ML
WBC # FLD AUTO: 6.28 K/UL

## 2025-07-01 RX ORDER — ROSUVASTATIN CALCIUM 10 MG/1
10 TABLET, FILM COATED ORAL
Qty: 90 | Refills: 3 | Status: ACTIVE | COMMUNITY
Start: 2025-07-01 | End: 1900-01-01

## 2025-07-06 VITALS
DIASTOLIC BLOOD PRESSURE: 78 MMHG | OXYGEN SATURATION: 97 % | HEART RATE: 71 BPM | RESPIRATION RATE: 15 BRPM | SYSTOLIC BLOOD PRESSURE: 143 MMHG

## 2025-07-17 ENCOUNTER — NON-APPOINTMENT (OUTPATIENT)
Age: 70
End: 2025-07-17

## 2025-07-21 ENCOUNTER — RESULT REVIEW (OUTPATIENT)
Age: 70
End: 2025-07-21

## 2025-07-21 ENCOUNTER — TRANSCRIPTION ENCOUNTER (OUTPATIENT)
Age: 70
End: 2025-07-21

## 2025-07-28 ENCOUNTER — APPOINTMENT (OUTPATIENT)
Dept: CARDIOLOGY | Facility: CLINIC | Age: 70
End: 2025-07-28
Payer: MEDICARE

## 2025-07-28 PROCEDURE — 93246 EXT ECG>7D<15D RECORDING: CPT

## 2025-08-11 ENCOUNTER — RESULT REVIEW (OUTPATIENT)
Age: 70
End: 2025-08-11

## 2025-08-12 RX ORDER — METOPROLOL SUCCINATE 50 MG/1
50 TABLET, EXTENDED RELEASE ORAL
Qty: 2 | Refills: 0 | Status: DISCONTINUED | COMMUNITY
Start: 2025-08-01 | End: 2025-08-12

## 2025-08-25 RX ORDER — TIRZEPATIDE 2.5 MG/.5ML
2.5 INJECTION, SOLUTION SUBCUTANEOUS
Qty: 4 | Refills: 4 | Status: ACTIVE | COMMUNITY

## 2025-08-25 RX ORDER — DOXAZOSIN 2 MG/1
2 TABLET ORAL DAILY
Refills: 0 | Status: ACTIVE | COMMUNITY

## 2025-08-27 ENCOUNTER — RX RENEWAL (OUTPATIENT)
Age: 70
End: 2025-08-27

## 2025-08-27 RX ORDER — METOPROLOL SUCCINATE 25 MG/1
25 TABLET, EXTENDED RELEASE ORAL DAILY
Qty: 90 | Refills: 3 | Status: ACTIVE | COMMUNITY
Start: 2025-08-25 | End: 1900-01-01

## 2025-09-04 ENCOUNTER — NON-APPOINTMENT (OUTPATIENT)
Age: 70
End: 2025-09-04